# Patient Record
Sex: MALE | Race: WHITE
[De-identification: names, ages, dates, MRNs, and addresses within clinical notes are randomized per-mention and may not be internally consistent; named-entity substitution may affect disease eponyms.]

---

## 2017-05-19 ENCOUNTER — HOSPITAL ENCOUNTER (INPATIENT)
Dept: HOSPITAL 59 - MEDSURG | Age: 57
LOS: 12 days | Discharge: HOME | DRG: 470 | End: 2017-05-31
Attending: ORTHOPAEDIC SURGERY | Admitting: ORTHOPAEDIC SURGERY
Payer: COMMERCIAL

## 2017-05-19 DIAGNOSIS — Z79.01: ICD-10-CM

## 2017-05-19 DIAGNOSIS — I10: ICD-10-CM

## 2017-05-19 DIAGNOSIS — M17.12: Primary | ICD-10-CM

## 2017-05-19 DIAGNOSIS — I42.8: ICD-10-CM

## 2017-05-19 DIAGNOSIS — I50.9: ICD-10-CM

## 2017-05-19 DIAGNOSIS — I48.0: ICD-10-CM

## 2017-05-19 PROCEDURE — 85014 HEMATOCRIT: CPT

## 2017-05-19 PROCEDURE — 97165 OT EVAL LOW COMPLEX 30 MIN: CPT

## 2017-05-19 PROCEDURE — 86900 BLOOD TYPING SEROLOGIC ABO: CPT

## 2017-05-19 PROCEDURE — 85018 HEMOGLOBIN: CPT

## 2017-05-19 PROCEDURE — 86850 RBC ANTIBODY SCREEN: CPT

## 2017-05-19 PROCEDURE — 85610 PROTHROMBIN TIME: CPT

## 2017-05-19 PROCEDURE — 86901 BLOOD TYPING SEROLOGIC RH(D): CPT

## 2017-05-19 PROCEDURE — 97116 GAIT TRAINING THERAPY: CPT

## 2017-05-19 PROCEDURE — 97110 THERAPEUTIC EXERCISES: CPT

## 2017-05-19 PROCEDURE — 94760 N-INVAS EAR/PLS OXIMETRY 1: CPT

## 2017-05-19 PROCEDURE — 80048 BASIC METABOLIC PNL TOTAL CA: CPT

## 2017-05-30 LAB
ABO GROUP: (no result)
ANTIBODY SCREEN: NEGATIVE
RH TYPE: POSITIVE

## 2017-05-30 PROCEDURE — 0SRD0J9 REPLACEMENT OF LEFT KNEE JOINT WITH SYNTHETIC SUBSTITUTE, CEMENTED, OPEN APPROACH: ICD-10-PCS | Performed by: ORTHOPAEDIC SURGERY

## 2017-05-30 RX ADMIN — DOCUSATE SODIUM SCH MG: 100 TABLET, FILM COATED ORAL at 22:43

## 2017-05-30 RX ADMIN — HYDROMORPHONE HYDROCHLORIDE PRN MG: 2 INJECTION, SOLUTION INTRAMUSCULAR; INTRAVENOUS; SUBCUTANEOUS at 19:38

## 2017-05-30 RX ADMIN — DEXTROSE AND SODIUM CHLORIDE SCH: 5; .9 INJECTION, SOLUTION INTRAVENOUS at 22:38

## 2017-05-30 RX ADMIN — HYDROCODONE BITARTRATE AND ACETAMINOPHEN PRN EACH: 325; 10 TABLET ORAL at 17:29

## 2017-05-30 RX ADMIN — CEFAZOLIN SODIUM SCH MLS/HR: 2 SOLUTION INTRAVENOUS at 17:13

## 2017-05-30 RX ADMIN — HYDROCODONE BITARTRATE AND ACETAMINOPHEN PRN EACH: 325; 10 TABLET ORAL at 22:49

## 2017-05-30 RX ADMIN — CEFAZOLIN SODIUM SCH MLS/HR: 2 SOLUTION INTRAVENOUS at 23:46

## 2017-05-30 RX ADMIN — DEXTROSE AND SODIUM CHLORIDE SCH MLS/HR: 5; .9 INJECTION, SOLUTION INTRAVENOUS at 15:10

## 2017-05-30 RX ADMIN — HYDROMORPHONE HYDROCHLORIDE PRN MG: 2 INJECTION, SOLUTION INTRAMUSCULAR; INTRAVENOUS; SUBCUTANEOUS at 13:20

## 2017-05-30 NOTE — REHAB EVALUATION
Patient Information





- Patient Information


Diagnosis: L TKA


Ordered Treatment: PT Evaluate and Treat


Status: Initial Evaluation


Surgery: Yes


Date of Surgery: 05/30/17


Past Medical/Surgical Hx: 


 PAST MEDICAL/SURGICAL HISTORY





Past Surgical History            left rotator cuff repair


                                 appy


                                 left index finger


                                 gastric sleeve


                                 c scope


                                 heart cath





PMH - Respiratory





Hx Respiratory Disorders         Yes


Hx Sleep Apnea                   Yes


Hx of CPAP                       Yes





PMH - Cardiovascular





Hx Cardiovascular Disorders      Yes


Hx Abnormal EKG                  Yes


Hx Cardiac Catheterization       Yes


Hx Congestive Heart Failure      Yes


Hx Edema                         Yes


Hx Hypertension                  Yes: on meds good control


Hx Irregular Heartbeat           Yes: A-fib


Exercise Tolerance               Good





PMH - Neuro





Hx Neurological Disorders        No





PMH - GI





Hx Gastrointestinal Disorders    Yes


Hx Diverticulitis                Yes





PMH - 





Hx Genitourinary Disorders       No





PMH - Endocrine





Hx Endocrine Disorders           No





PMH - Musculoskeletal





Hx Musculoskeletal Disorders     Yes


Hx Arthritis                     Yes: left knee





PMH - Psych





Hx Psychiatric Problems          No





PMH - Hematology/Oncology





Hx Hematology/Oncology           No


Disorders                        








Premorbid Status: Detail


Social History: Detail (The patient lives with spouse in a one story home with 

no stairs.  The patient has a regular tub/shower combination and a high rise 

toilet.  The patient was vended a standard walker.)


Precautions: Other (WBAT on the L LE.)





- Time With Patient


Total Time Spent With Patient (Min): 30





Subjective Information





- Subjective Information


Per Patient (The patient had complaints of level 2 pain in L knee.)





Objective Data





- Pain


Pain Present: Yes





- Mental Status


Patient Orientation: Oriented x3





- Visual Perception


Appears within normal limits for therapeutic activities





- ROM


Not within normal limits (The patient's L knee AROM was flexion 85 degrees, 

extension 0 degrees.)





- Strength/Tone


Not within normal limits (The patient's R LE strength was generally 4+ to 5/5. 

The patient's L LE strength was not formaly assessed secondary to S/P surgery 

however the patient's strength is functional.)





- Bed Mobility


Independent (The patient was independent with supine to and from sit transfer 

and scooting up in bed.)





- Transfers


Independent (The patient was indpendent with sit to and from stand transfer.)





- Balance


Balance Sitting: Good


Balance Standing: Good





- Gait


Detail (The patient ambulated with standard walker a distance of 80 feet x 1 

WBAT on the L LE with supervision for safety and occasional verbal cues to not 

place walker too far forward.)





Therapy Assessment





- Therapy Assessment


Detail (The patient was independent with all mobility and required verbal cues 

for safety with ambulation.  The patient completed exercises with occasional 

verbal cues for proper technique.  Anticipate the patient will do well with 

mobility and complete inpatient PT on 5/31/17.)





Patient Education





- Patient Education


Teaching Topic: Equipment Use, Exercise/Activity (The patient completed the 

following exercises: seated heel slides, quad sets, hamstring sets, gluteal sets

, ankle pumps, SLR all x 5 reps.)


Response: Return Demonstration


Teaching Method: Handout


Teaching Recipient: Patient


Barriers To Learning: None





Problem List





- Problem List


Physical Therapy Problem List: Detail (1) Decreased L knee AROM as to be 

expected following surgery. 2) Verbal cues for safety with ambulation.)





Goals





- Goals


Physical Therapy Goals: 1) Independent with ambulation on levels WBAT on the L 

LE  with assistive device.  2) Supervision with ambulation on stairs or at 

least able to recall proper technique for stair climbing( the patient has no 

stairs at home.).  3) Independent with HEP.





Prognosis





- Prognosis


Good





Plan





- Plan


Physical Therapy Plan: PT 2 times a day for gait training, transfer training 

and instruction in HEP until all inpatient goals are met.

## 2017-05-31 LAB
ANION GAP SERPL CALC-SCNC: 3.5 MMOL/L (ref 7–16)
BUN SERPL-MCNC: 17 MG/DL (ref 9–20)
CO2 SERPL-SCNC: 27.5 MMOL/L (ref 22–30)
CREAT SERPL-MCNC: 1 MG/DL (ref 0.66–1.25)
EST GLOMERULAR FILTRATION RATE: > 60 ML/MIN
GLUCOSE SERPL-MCNC: 111 MG/DL (ref 70–110)
HCT VFR BLD CALC: 38.1 % (ref 42–52)
HGB BLD-MCNC: 12.3 GM/DL (ref 14–18)
INR PPP: 2.13
PROTHROMBIN TIME: 24.1 SECONDS (ref 9.5–12.1)

## 2017-05-31 RX ADMIN — HYDROCODONE BITARTRATE AND ACETAMINOPHEN PRN EACH: 325; 10 TABLET ORAL at 18:17

## 2017-05-31 RX ADMIN — HYDROCODONE BITARTRATE AND ACETAMINOPHEN PRN EACH: 325; 10 TABLET ORAL at 13:44

## 2017-05-31 RX ADMIN — CEFAZOLIN SODIUM SCH MLS/HR: 2 SOLUTION INTRAVENOUS at 08:18

## 2017-05-31 RX ADMIN — DOCUSATE SODIUM SCH MG: 100 TABLET, FILM COATED ORAL at 09:05

## 2017-05-31 RX ADMIN — HYDROCODONE BITARTRATE AND ACETAMINOPHEN PRN EACH: 325; 10 TABLET ORAL at 08:17

## 2017-05-31 RX ADMIN — HYDROCODONE BITARTRATE AND ACETAMINOPHEN PRN EACH: 325; 10 TABLET ORAL at 04:35

## 2017-05-31 RX ADMIN — DEXTROSE AND SODIUM CHLORIDE SCH: 5; .9 INJECTION, SOLUTION INTRAVENOUS at 04:48

## 2017-05-31 NOTE — PHYSICAL THERAPY TX NOTE
Physical Therapy Tx Note





- Treatment Note


Tolerated: Good (Patient had a very painful night but pain under control now 

and seems much better.  Willing to walk in yanes and even try steps to make sure 

safe with walker and rail.)


Total Time Spent With Patient: 30


Physical Therapy Tx Note: Detail (Patient seen in room and sleeping since had a 

rough night. Current pain med helping greatly and patient willing to get up and 

walk.  Patient required very little help with left leg supine to sit, assist 

was to swing leg over to edge of bed, but able to let it down himself.  Sit to 

stand with CGA only, ambulated to bathroom with standard walker, WBAT and stood 

to use commode independently then washed hands and ambulated in yanes with SBA 

and standard walker to stairs, went down steps correctly without cues using 

folded walker and rail, pivoted then back up stairs with good technique. 

Ambulated to end of yanes then back to room, about 100+ feet. Sat up in chair 

with call light and tray table close. Patient to call nurse if wants to get 

back in bed.  Worked on knee exercises seated with about 60-70 degrees of knee 

bend.)


Physical Therapy Problem List: Detail (1) Decreased L knee AROM as to be 

expected following surgery. 2) Verbal cues for safety with ambulation.)


Physical Therapy Goals: 1) Independent with ambulation on levels WBAT on the L 

LE  with assistive device.  2) Supervision with ambulation on stairs or at 

least able to recall proper technique for stair climbing( the patient has no 

stairs at home.).  3) Independent with HEP.


Prognosis: Good (Patient doing very well and may be seen this afternoon if 

wants to walk more but has passed skills required to safely go home.)


Physical Therapy Plan: PT 2 times a day for gait training, transfer training 

and instruction in HEP until all inpatient goals are met.

## 2017-05-31 NOTE — REHAB EVALUATION
Patient Information





- Patient Information


Diagnosis: L TKA


Ordered Treatment: OT Evaluate and Treat


Status: Initial Evaluation


Surgery: Yes


Date of Surgery: 05/30/17


Past Medical/Surgical Hx: 


 PAST MEDICAL/SURGICAL HISTORY





Past Surgical History            left rotator cuff repair


                                 appy


                                 left index finger


                                 gastric sleeve


                                 c scope


                                 heart cath





PMH - Respiratory





Hx Respiratory Disorders         Yes


Hx Sleep Apnea                   Yes


Hx of CPAP                       Yes





PMH - Cardiovascular





Hx Cardiovascular Disorders      Yes


Hx Abnormal EKG                  Yes


Hx Cardiac Catheterization       Yes


Hx Congestive Heart Failure      Yes


Hx Edema                         Yes


Hx Hypertension                  Yes: on meds good control


Hx Irregular Heartbeat           Yes: A-fib


Exercise Tolerance               Good





PMH - Neuro





Hx Neurological Disorders        No





PMH - GI





Hx Gastrointestinal Disorders    Yes


Hx Diverticulitis                Yes





PMH - 





Hx Genitourinary Disorders       No





PMH - Endocrine





Hx Endocrine Disorders           No





PMH - Musculoskeletal





Hx Musculoskeletal Disorders     Yes


Hx Arthritis                     Yes: left knee





PMH - Psych





Hx Psychiatric Problems          No





PMH - Hematology/Oncology





Hx Hematology/Oncology           No


Disorders                        








Premorbid Status: Detail (Independent with all ADLs PTA)


Social History: Detail (The patient lives with spouse in a two story home but 

lives on first floor only (bedroom and bathroom on first floor).  The patient 

has a regular tub/shower combination and a high rise toilet.  The patient was 

vended a standard walker by PT. Patient's spouse works during the day but will 

come home at lunch to assist patient. Patient also has a daughter and son-in-

law that will be checking on him throughout the day while spouse is at work.)


Precautions: Other (WBAT on the L LE.)





- Time With Patient


Total Time Spent With Patient (Min): 10


Treatment Procedures: Detail (OT evaluation low)





Subjective Information





- Subjective Information


Per Patient





Objective Data





- Pain


Pain Present: Yes


Pain Intensity: 6 (L knee)


Pain Scale Used: Numeric (1 - 10)





- Mental Status


Patient Orientation: Oriented x3





- Visual Perception


Appears within normal limits for therapeutic activities





- ROM


Within normal limits (BUE's)





- Strength/Tone


Within normal limits (BUE's)





- Coordination


Appears within normal limits for therapeutic activities





- ADL's/IADL's


Detail (Patient toileting independently. He is able to don/doff socks without 

assist. Discussed ADL equipment available if he needs any help once home. 

Discussed technique for drsg and patient verbalized understanding. Feels he 

does not need any quipment at this time and if so, then family will help. 

Patient plans to sponge bath for first few days following return home.)





Therapy Assessment





- Therapy Assessment


Detail (Patient verbalized understanding of ADL equipment available and 

dressing technique. Feels he does not need any equipment and will have a lot of 

support from family to assist as needed.)





Patient Education





- Patient Education


Teaching Topic: Equipment Use, Other (dressing techniques)


Response: Verbalize Understanding


Teaching Method: Discussion


Teaching Recipient: Patient


Barriers To Learning: None





Problem List





- Problem List


Physical Therapy Problem List: Detail (1) Decreased L knee AROM as to be 

expected following surgery. 2) Verbal cues for safety with ambulation.)





Goals





- Goals


Physical Therapy Goals: 1) Independent with ambulation on levels WBAT on the L 

LE  with assistive device.  2) Supervision with ambulation on stairs or at 

least able to recall proper technique for stair climbing( the patient has no 

stairs at home.).  3) Independent with HEP.





Prognosis





- Prognosis


Good





Plan





- Plan


Physical Therapy Plan: PT 2 times a day for gait training, transfer training 

and instruction in HEP until all inpatient goals are met.


Occupational Therapy Plan: No further OT needed at this time.

## 2017-05-31 NOTE — PHYSICAL THERAPY TX NOTE
Physical Therapy Tx Note





- Treatment Note


Physical Therapy Tx Note: Detail (The patient refused pm treatment due to L 

knee pain.  The patient passed all PT skills in the am including independence 

with transfers, bed mobility, ambulation on levels and stairs. The patient was 

also independent with HEP.)


Physical Therapy Problem List: Detail (1) Decreased L knee AROM as to be 

expected following surgery. 2) Verbal cues for safety with ambulation.)


Physical Therapy Goals: 1) Independent with ambulation on levels WBAT on the L 

LE  with assistive device.  2) Supervision with ambulation on stairs or at 

least able to recall proper technique for stair climbing( the patient has no 

stairs at home.).  3) Independent with HEP.


Physical Therapy Plan: The patient completed all PT skills this am and is 

discharged from inpatient PT.

## 2017-06-01 NOTE — DISCHARGE SUMMARY
DATE OF ADMISSION:  05/30/2017. 



DATE OF DISCHARGE:  05/31/2017. 



DATE OF SURGERY:  05/30/2017. 



PRIMARY DIAGNOSIS:  End-stage arthrosis, left knee.  



SECONDARY DIAGNOSES:

1.  Congestive heart failure, stable. 

2.  Atrial fibrillation, stable. 

3.  Hypertension, stable.  



OPERATIONS AND PROCEDURES:  Cemented left total knee arthroplasty. 



HISTORY OF PRESENT ILLNESS:  The patient is a delightful 56-year-old man who presents with end-stage 
arthrosis of his left knee.  He was admitted after a left total knee arthroplasty.  Postoperatively, 
he did well.  



HOSPITAL COURSE:  His hospital course was unremarkable.  I plan to discharge him home to the care of 
his family.  Home PT, visiting nurse has been arranged.  He will be given Norco for pain.  He takes 
Coumadin chronically, so he will continue with that, and that will cover him for DVT prophylaxis.  
He will follow up in my office in 4 weeks.  His discharge condition was good.  These instructions 
were given directly to him. 





CC: Dr. John ASENICO

## 2017-06-01 NOTE — OPERATIVE NOTE
DATE OF SURGERY: 05/30/2017 



PREOPERATIVE DIAGNOSIS: End-stage arthrosis of the left knee. 



POSTOPERATIVE DIAGNOSIS: End-stage arthrosis of the left knee. 



OPERATION: Cemented left total knee arthroplasty using [employer] Jody II components, with a 
size 7 Oxinium femur, a size 7 stem tibia baseplate, a 9 mm left tibial insert highly cross-linked, 
and a 35 mm all plastic patella. 



Surgeon: Demarcus Tse MD 



Anesthesia: General. 



PREPARATION: Chloraprep. 



INDIVIDUAL CONSIDERATIONS: None. 



FIRST ASSISTANT: Mrs. Batista. 



PROCEDURE: The patient was taken to the operating room, placed supine on the operating room table. 
He had successful induction with general anesthetic. His left lower extremity was prepped and draped 
in the usual fashion. The patient had a midline approach to the knee. Limb was elevated, tourniquet 
was inflated to 300 mmHg. Sharp dissection carried down through skin and subcutaneous tissues. All 
veins were coagulated with the Bovie. A medial arthrotomy was performed. Patella was everted and it 
was flexed. Patient had exposed bone in the medial and patellofemoral compartments. Fat pad was 
resected, capsule was released to the medial proximal tibia, ACL was sacrificed, and provisional 
anterior meniscectomies were performed. The initial femoral  hole was then made freehand. The 
intramedullary femoral cutting jig was placed. It was cut in 7.0 degrees of valgus and adjusted for 
rotation, secured with pins for a 10 mm resection. The initial transverse cut was then made. Skin 
guide was placed for anterior and posterior  holes. It was found that a size 7 would be 
appropriate. The anterior and posterior cuts followed by chamfer cuts were made. Osteophytes 
removed. A size 7 trial was placed and found to fit well. 



The tibia was brought forward. Remainder of the meniscal remnants removed with a Bovie. The 
extraarticular tibial cutting jig was placed. It was cut in neutral with 3-degree AP slope. It was 
set for a 9 mm resection _____ off the high level side, secured with pins. When cutting the tibia, 
care was taken to preserve the PCL insertion on the tibia. Medial osteophytes removed and found a 
size 7 trial fit appropriate. It was adjusted for location and secured with pins. When cutting the 
tibia, care was taken to preserve the PCL insertion of the tibia. It was found that a size 7 would 
be appropriate. A size 7 trial was placed with a 9 mm _____ femoral trial. There was excellent 
motion and stability. Ligamentous balance was thought to be normal. The femoral  holes were 
impacted and the tri-flange tibial stamp was impacted and these trial components removed. 



The patient had thick patella and roughly 9 mm of bone was removed with the oscillating saw. A 35 
patella fit easily and the 3  holes were then drilled. The knee was then thoroughly irrigated 
out to remove any visible or palpable debris with Betadine and saline. The tourniquet was let down 
briefly to get bleeders posteriorly and placed back up again. Bony surfaces were then dried. A size 
7 stem tibia baseplate was cemented into place followed by impaction of the 9 mm highly cross-linked 
left tibial insert followed by cementing in the size 7 Oxinium femur followed by cementing in a 35 
mm patella. Implant surfaces were compressed, excess cement was removed and after the cement had 
set, there was excellent motion and stability. Ligamentous balance, rotation, alignment, and 
patellofemoral tracking were normal and a _____ was required. Tourniquet let down. Hemostasis was 
obtained with a Bovie. Final irrigation with Betadine and saline. The skin and soft tissues were 
then infiltrated with 0.5% Marcaine with epinephrine. The capsule was then closed with a running #2 
quill, subcu was closed with running 0 quill, skin was closed with running 2-0 quill. Then 40 mL of 
saline mixed with 1 gram of tranexamic acid was injected into the knee. The patient did receive 1 g 
of tranexamic acid IV prior. Sterile bulky compressive Aquacel type dressing was applied. The 
patient tolerated the procedure well. Needle and sponge counts were correct. Estimated blood loss 
was minimal. He was taken back to recovery in good condition. There were no complications. 



CC: Dr. Regino ASENCIO

## 2019-02-19 ENCOUNTER — HOSPITAL ENCOUNTER (OUTPATIENT)
Dept: HOSPITAL 59 - SUR | Age: 59
LOS: 1 days | Discharge: HOME HEALTH | End: 2019-02-20
Attending: ORTHOPAEDIC SURGERY
Payer: COMMERCIAL

## 2019-02-19 DIAGNOSIS — I48.91: ICD-10-CM

## 2019-02-19 DIAGNOSIS — G47.33: ICD-10-CM

## 2019-02-19 DIAGNOSIS — Z79.01: ICD-10-CM

## 2019-02-19 DIAGNOSIS — E78.00: ICD-10-CM

## 2019-02-19 DIAGNOSIS — I50.9: ICD-10-CM

## 2019-02-19 DIAGNOSIS — G25.81: ICD-10-CM

## 2019-02-19 DIAGNOSIS — I10: ICD-10-CM

## 2019-02-19 DIAGNOSIS — M17.11: Primary | ICD-10-CM

## 2019-02-19 LAB
ABO GROUP: (no result)
ANTIBODY SCREEN: NEGATIVE
INR PPP: 3.3
PROTHROMBIN TIME: 32.1 SECONDS (ref 9.5–12.1)
RH TYPE: POSITIVE

## 2019-02-19 PROCEDURE — 86901 BLOOD TYPING SEROLOGIC RH(D): CPT

## 2019-02-19 PROCEDURE — 97110 THERAPEUTIC EXERCISES: CPT

## 2019-02-19 PROCEDURE — 94760 N-INVAS EAR/PLS OXIMETRY 1: CPT

## 2019-02-19 PROCEDURE — 85018 HEMOGLOBIN: CPT

## 2019-02-19 PROCEDURE — 85014 HEMATOCRIT: CPT

## 2019-02-19 PROCEDURE — 27447 TOTAL KNEE ARTHROPLASTY: CPT

## 2019-02-19 PROCEDURE — 80048 BASIC METABOLIC PNL TOTAL CA: CPT

## 2019-02-19 PROCEDURE — 86900 BLOOD TYPING SEROLOGIC ABO: CPT

## 2019-02-19 PROCEDURE — 86850 RBC ANTIBODY SCREEN: CPT

## 2019-02-19 PROCEDURE — 97530 THERAPEUTIC ACTIVITIES: CPT

## 2019-02-19 PROCEDURE — 85610 PROTHROMBIN TIME: CPT

## 2019-02-19 PROCEDURE — 01402 ANES OPN/ARTH TOT KNE ARTHRP: CPT

## 2019-02-19 RX ADMIN — DEXTROSE AND SODIUM CHLORIDE SCH: 5; .9 INJECTION, SOLUTION INTRAVENOUS at 21:50

## 2019-02-19 RX ADMIN — CEFAZOLIN SODIUM SCH MLS/HR: 2 SOLUTION INTRAVENOUS at 18:33

## 2019-02-19 RX ADMIN — DOCUSATE SODIUM SCH MG: 100 TABLET, FILM COATED ORAL at 21:48

## 2019-02-19 RX ADMIN — DEXTROSE AND SODIUM CHLORIDE SCH: 5; .9 INJECTION, SOLUTION INTRAVENOUS at 14:34

## 2019-02-19 RX ADMIN — HYDROCODONE BITARTRATE AND ACETAMINOPHEN PRN EACH: 325; 10 TABLET ORAL at 18:35

## 2019-02-19 RX ADMIN — HYDROCODONE BITARTRATE AND ACETAMINOPHEN PRN EACH: 325; 10 TABLET ORAL at 22:47

## 2019-02-19 NOTE — REHAB EVALUATION
Patient Information





- Patient Information


Diagnosis: OA R knee


Ordered Treatment: PT Evaluate and Treat


Status: Initial Evaluation


Surgery: Yes (R TKA)


Date of Surgery: 02/19/19


Past Medical/Surgical Hx: 


 PAST MEDICAL/SURGICAL HISTORY





Surgery to Affected Area?        No


Recent Surgery?                  


Past Surgical History            HEART CATH 8-2018


                                 LTKA 5-


                                 left rotator cuff repair


                                 appy


                                 left index finger


                                 gastric sleeve


                                 c scope


                                 heart cath





PMH - Respiratory





Hx Respiratory Disorders         Yes


Hx Sleep Apnea                   Yes


Hx of CPAP                       Yes





PMH - Cardiovascular





Hx Cardiovascular Disorders      Yes


Hx Abnormal EKG                  Yes


Hx Cardiac Catheterization       Yes: WNL 2018


Hx Congestive Heart Failure      Yes


Hx Edema                         Yes: LE AT TIMES


Hx Hypertension                  Yes: GOOD CONTROL ON MEDS


Hx Irregular Heartbeat           Yes: A-fib


Exercise Tolerance               Poor





PMH - Neuro





Hx Neurological Disorders        No


Comment:                         RLS





PMH - GI





Hx Gastrointestinal Disorders    Yes


Hx Diverticulitis                Yes


Hx Weight Loss/Weight Gain       Yes: UP 22 LBS SINCE LAST TOTAL 5-





PMH - 





Hx Genitourinary Disorders       No





PMH - Endocrine





Hx Endocrine Disorders           No





PMH - Musculoskeletal





Hx Musculoskeletal Disorders     Yes


Hx Arthritis                     Yes: RIGHT KNEE





PMH - Psych





Hx Psychiatric Problems          No





PMH - Hematology/Oncology





Hx Hematology/Oncology           Yes


Disorders                        


Hx Clotting Problems             Yes: ON COUMADIN








Premorbid Status: Detail (The patient was independent with all mobility prior 

to surgery.)


Social History: Detail (The patient lives with spouse in 2 story house with one 

step at the enterance. The patient will be staying on the first level after 

surgery. The bathroom is equipped with a tub/shower combination, hand held 

shower and an elevated toilet. No grab bars are present in the bathroom. The 

patient has a front wheeled walker and a standard cane.)


Precautions: Universal, Fall, Other (WBAT on the R LE.)





- Time With Patient


Total Time Spent With Patient (Min): 20


Treatment Procedures: Detail (Initial Evaluation, gait training)





Subjective Information





- Subjective Information


Per Patient (The patient had no complaints of pain. The patient complained of 

being sleepy.)





Objective Data





- Mental Status


Patient Orientation: Oriented x3





- Visual Perception


Appears within normal limits for therapeutic activities





- ROM


Not within normal limits (The patient's R knee AROM was limited s/p surgery. 

All other AROM was WNL.)





- Strength/Tone


Not within normal limits (The patient's R LE strength was not tested s/p surgery

, however was functional ie: the patient was able to complete a SLR. The patient

's L LE strength was WFL.)





- Bed Mobility


Independent (The patient was independent with supine to sit transfer.)





- Transfers


Independent (The patient was independent with sit to and from stand transfer.)





- Balance


Balance Sitting: Good


Balance Standing: Good





- Sensation


Intact





- Gait


Detail (The patient ambulated with front wheeled walker to and from bathroom 8 

feet x 2 WBAT on the R LE with supervision of 1 to handle equipment.)





Therapy Assessment





- Therapy Assessment


Detail (The patient was independent with transfers, bed mobility and required 

supervision for safety with ambulation. Feel the patient will progress well 

with mobility. Due to stable condition the PT evaluation complexity is rated as 

low.)





Problem List





- Problem List


Physical Therapy Problem List: Detail (1) Decreased R knee AROM as to be 

expected s/p surgery. 2) Decreased R LE strength as to be expected s/p surgery.)





Goals





- Goals


Physical Therapy Goals: 1) The patient will ambulate with standard walker a 

distance of 50 feet plus.  2) The patient will ambulate on stairs using proper 

technique with supervision for safety.  3) The patient will independent with 

TKA HEP.





Prognosis





- Prognosis


Good





Plan





- Plan


Physical Therapy Plan: PT 1-2 times a day for gait training on levels and 

stairs and instruction in HEP.

## 2019-02-20 LAB
ANION GAP SERPL CALC-SCNC: 9 MMOL/L (ref 7–16)
BUN SERPL-MCNC: 16 MG/DL (ref 8–23)
CO2 SERPL-SCNC: 29 MMOL/L (ref 22–29)
CREAT SERPL-MCNC: 0.9 MG/DL (ref 0.7–1.2)
EST GLOMERULAR FILTRATION RATE: > 60 ML/MIN
GLUCOSE SERPL-MCNC: 122 MG/DL (ref 74–109)
HCT VFR BLD CALC: 39.8 % (ref 42–52)
HGB BLD-MCNC: 12.7 GM/DL (ref 14–18)
INR PPP: 4.1
PROTHROMBIN TIME: 40.1 SECONDS (ref 9.5–12.1)

## 2019-02-20 RX ADMIN — DEXTROSE AND SODIUM CHLORIDE SCH: 5; .9 INJECTION, SOLUTION INTRAVENOUS at 13:43

## 2019-02-20 RX ADMIN — CEFAZOLIN SODIUM SCH MLS/HR: 2 SOLUTION INTRAVENOUS at 03:31

## 2019-02-20 RX ADMIN — CEFAZOLIN SODIUM SCH MLS/HR: 2 SOLUTION INTRAVENOUS at 10:20

## 2019-02-20 RX ADMIN — HYDROCODONE BITARTRATE AND ACETAMINOPHEN PRN EACH: 325; 10 TABLET ORAL at 03:31

## 2019-02-20 RX ADMIN — HYDROCODONE BITARTRATE AND ACETAMINOPHEN PRN EACH: 325; 10 TABLET ORAL at 07:44

## 2019-02-20 RX ADMIN — HYDROCODONE BITARTRATE AND ACETAMINOPHEN PRN EACH: 325; 10 TABLET ORAL at 16:06

## 2019-02-20 RX ADMIN — DOCUSATE SODIUM SCH MG: 100 TABLET, FILM COATED ORAL at 09:07

## 2019-02-20 RX ADMIN — HYDROCODONE BITARTRATE AND ACETAMINOPHEN PRN EACH: 325; 10 TABLET ORAL at 12:00

## 2019-02-20 NOTE — REHAB EVALUATION
Patient Information





- Patient Information


Diagnosis: OA R knee


Ordered Treatment: OT Evaluate and Treat


Status: Initial Evaluation


Surgery: Yes (R TKA)


Date of Surgery: 02/19/19


Past Medical/Surgical Hx: 


 PAST MEDICAL/SURGICAL HISTORY





Surgery to Affected Area?        No


Recent Surgery?                  


Past Surgical History            HEART CATH 8-2018


                                 LTKA 5-


                                 left rotator cuff repair


                                 appy


                                 left index finger


                                 gastric sleeve


                                 c scope


                                 heart cath





PMH - Respiratory





Hx Respiratory Disorders         Yes


Hx Sleep Apnea                   Yes


Hx of CPAP                       Yes





PMH - Cardiovascular





Hx Cardiovascular Disorders      Yes


Hx Abnormal EKG                  Yes


Hx Cardiac Catheterization       Yes: WNL 2018


Hx Congestive Heart Failure      Yes


Hx Edema                         Yes: LE AT TIMES


Hx Hypertension                  Yes: GOOD CONTROL ON MEDS


Hx Irregular Heartbeat           Yes: A-fib


Exercise Tolerance               Poor





PMH - Neuro





Hx Neurological Disorders        No


Comment:                         RLS





PMH - GI





Hx Gastrointestinal Disorders    Yes


Hx Diverticulitis                Yes


Hx Weight Loss/Weight Gain       Yes: UP 22 LBS SINCE LAST TOTAL 5-





PMH - 





Hx Genitourinary Disorders       No





PMH - Endocrine





Hx Endocrine Disorders           No





PMH - Musculoskeletal





Hx Musculoskeletal Disorders     Yes


Hx Arthritis                     Yes: RIGHT KNEE





PMH - Psych





Hx Psychiatric Problems          No





PMH - Hematology/Oncology





Hx Hematology/Oncology           Yes


Disorders                        


Hx Clotting Problems             Yes: ON COUMADIN








Premorbid Status: Detail (The patient was independent with all mobility, meal 

prep, laundry and home mgmt tasks prior to surgery.)


Social History: Detail (The patient lives with spouse in 2 story house with one 

step at the entrance. The patient will be staying on the first level after 

surgery. The bathroom is equipped with a tub/shower combination, hand held 

shower and an elevated toilet. No grab bars are present in the bathroom. The 

patient has a front wheeled walker and a standard cane.)


Precautions: Universal, Fall, Other (WBAT on the R LE.)





- Time With Patient


Total Time Spent With Patient (Min): 30


Treatment Procedures: Detail (OT eval low complexity)





Subjective Information





- Subjective Information


Per Patient





Objective Data





- Pain


Pain Present: Yes (5/10)





- Mental Status


Patient Orientation: Oriented x3





- Visual Perception


Appears within normal limits for therapeutic activities





- ROM


Within normal limits ([facility] UE AROM WNL)





- Strength/Tone


Within normal limits ([facility] UE strength WNL)





- Coordination


Appears within normal limits for therapeutic activities





- Transfers


Independent (Ind with sit to stand from EOB and chair)





- Balance


Balance Sitting: Good


Balance Standing: Good





- Sensation


Intact





- Gait


Detail (Pt ambulated in hallway with standard walker and SBA.)





- ADL's/IADL's


Detail (Pt educated and able to demostrate learning of modified LE dressing 

techniques including doffing pants, donning boxer shorts, pants and slip on 

tennis shoes.  Reviewed shower and kitchen modifications and safety, pt 

verbalized understanding.)





Therapy Assessment





- Therapy Assessment


Detail (Pt is Ind with modified LE dressing techniques.)





Problem List





- Problem List


Physical Therapy Problem List: Detail (1) Decreased R knee AROM as to be 

expected s/p surgery. 2) Decreased R LE strength as to be expected s/p surgery.)


Occupational Therapy Problem List: Detail (No current IP OT problems identified.

)





Goals





- Goals


Physical Therapy Goals: 1) The patient will ambulate with standard walker a 

distance of 50 feet plus.(Goal Met).  2) The patient will ambulate on stairs 

using proper technique with supervision for safety.(Goal Met).  3) The patient 

will independent with TKA HEP. (Goal Met)


Occupational Therapy Goals: No current IP OT goals identified.





Prognosis





- Prognosis


Good





Plan





- Plan


Physical Therapy Plan: The patient has met all inpatient goals. The patient is 

to receive Home PT.


Occupational Therapy Plan: No further IP OT recommended.  Thank you for this 

referral.

## 2019-02-20 NOTE — PHYSICAL THERAPY TX NOTE
Physical Therapy Tx Note





- Treatment Note


Tolerated: Good


Total Time Spent With Patient: 25


Physical Therapy Tx Note: Detail (The patient was sitting on the edge of bed 

when PT arrived. The patient ambulated independently with standard walker a 

distance of 50 feet plus, WBAT on the R LE. The patient ambulated on stairs 

with supervision for safety using one railing and walker with supervision for 

safety only using proper technique. The patient completed  and verbalized good 

understanding of the following TKA exercises: seated heel slides, ankle pumps, 

quad sets, hamstring sets, gluteal sets, SLR.  The patient has met all 

inpatient goals and is discharged from inpatient PT.)


Physical Therapy Problem List: Detail (1) Decreased R knee AROM as to be 

expected s/p surgery. 2) Decreased R LE strength as to be expected s/p surgery.)


Physical Therapy Goals: 1) The patient will ambulate with standard walker a 

distance of 50 feet plus.(Goal Met).  2) The patient will ambulate on stairs 

using proper technique with supervision for safety.(Goal Met).  3) The patient 

will independent with TKA HEP. (Goal Met)


Physical Therapy Plan: The patient has met all inpatient goals. The patient is 

to receive Home PT.